# Patient Record
Sex: FEMALE | Race: WHITE
[De-identification: names, ages, dates, MRNs, and addresses within clinical notes are randomized per-mention and may not be internally consistent; named-entity substitution may affect disease eponyms.]

---

## 2017-07-09 ENCOUNTER — HOSPITAL ENCOUNTER (EMERGENCY)
Dept: HOSPITAL 25 - ED | Age: 42
LOS: 1 days | Discharge: HOME | End: 2017-07-10
Payer: COMMERCIAL

## 2017-07-09 DIAGNOSIS — F17.210: ICD-10-CM

## 2017-07-09 DIAGNOSIS — K80.50: Primary | ICD-10-CM

## 2017-07-09 DIAGNOSIS — R10.11: ICD-10-CM

## 2017-07-09 PROCEDURE — 80053 COMPREHEN METABOLIC PANEL: CPT

## 2017-07-09 PROCEDURE — 36415 COLL VENOUS BLD VENIPUNCTURE: CPT

## 2017-07-09 PROCEDURE — 83690 ASSAY OF LIPASE: CPT

## 2017-07-09 PROCEDURE — 96374 THER/PROPH/DIAG INJ IV PUSH: CPT

## 2017-07-09 PROCEDURE — 86140 C-REACTIVE PROTEIN: CPT

## 2017-07-09 PROCEDURE — 96375 TX/PRO/DX INJ NEW DRUG ADDON: CPT

## 2017-07-09 PROCEDURE — 85025 COMPLETE CBC W/AUTO DIFF WBC: CPT

## 2017-07-09 PROCEDURE — 99284 EMERGENCY DEPT VISIT MOD MDM: CPT

## 2017-07-09 PROCEDURE — 83605 ASSAY OF LACTIC ACID: CPT

## 2017-07-09 PROCEDURE — 74177 CT ABD & PELVIS W/CONTRAST: CPT

## 2017-07-09 PROCEDURE — 81003 URINALYSIS AUTO W/O SCOPE: CPT

## 2017-07-10 VITALS — DIASTOLIC BLOOD PRESSURE: 78 MMHG | SYSTOLIC BLOOD PRESSURE: 115 MMHG

## 2017-07-10 LAB
ALBUMIN SERPL BCG-MCNC: 4.2 G/DL (ref 3.2–5.2)
ALP SERPL-CCNC: 57 U/L (ref 34–104)
ALT SERPL W P-5'-P-CCNC: 22 U/L (ref 7–52)
ANION GAP SERPL CALC-SCNC: 6 MMOL/L (ref 2–11)
AST SERPL-CCNC: 14 U/L (ref 13–39)
BUN SERPL-MCNC: 14 MG/DL (ref 6–24)
BUN/CREAT SERPL: 15.4 (ref 8–20)
CALCIUM SERPL-MCNC: 9.2 MG/DL (ref 8.6–10.3)
CHLORIDE SERPL-SCNC: 105 MMOL/L (ref 101–111)
GLOBULIN SER CALC-MCNC: 2.7 G/DL (ref 2–4)
GLUCOSE SERPL-MCNC: 119 MG/DL (ref 70–100)
HCO3 SERPL-SCNC: 24 MMOL/L (ref 22–32)
HCT VFR BLD AUTO: 44 % (ref 35–47)
HGB BLD-MCNC: 15.3 G/DL (ref 12–16)
LIPASE SERPL-CCNC: 35 U/L (ref 11–82)
MCH RBC QN AUTO: 33 PG (ref 27–31)
MCHC RBC AUTO-ENTMCNC: 35 G/DL (ref 31–36)
MCV RBC AUTO: 95 FL (ref 80–97)
POTASSIUM SERPL-SCNC: 3.5 MMOL/L (ref 3.5–5)
PROT SERPL-MCNC: 6.9 G/DL (ref 6.4–8.9)
RBC # BLD AUTO: 4.61 10^6/UL (ref 4–5.4)
SODIUM SERPL-SCNC: 135 MMOL/L (ref 133–145)
WBC # BLD AUTO: 11.2 10^3/UL (ref 3.5–10.8)

## 2017-07-10 NOTE — ED
Nhung DOVER Thomas, scribed for Jerry Tucker MD on 07/09/17 at 2357 .





Abdominal Pain/Female





- HPI Summary


HPI Summary: 





The pt is a 41 y/o F presenting to the ED c/o 8/10 RUQ abd pain that began 

today at 10:00. She was not exerting at onset, and it has worsened since onset. 

She denies nausea at this time. She took ibuprofen PTA for the pain. She claims 

that she had an endoscopy three months ago. NKDA. 








- History of Current Complaint


Chief Complaint: EDFlankPain


Stated Complaint: RIGHT FLANK PAIN


Time Seen by Provider: 07/09/17 23:52


Hx Obtained From: Patient


Hx Last Menstrual Period: 8/4/16


Onset/Duration: Lasting Hours - onset 22:00 today, Still Present, Worse Since - 

onset


Timing: Hours - onset 22:00


Severity Currently: Severe


Pain Intensity: 8


Pain Scale Used: 0-10 Numeric


Location: Discrete At: RUQ


Associated Signs and Symptoms: Negative: Nausea


Allergies/Adverse Reactions: 


 Allergies











Allergy/AdvReac Type Severity Reaction Status Date / Time


 


No Known Allergies Allergy   Verified 07/09/17 23:45














PMH/Surg Hx/FS Hx/Imm Hx


Previously Healthy: No


Endocrine/Hematology History: 


   Denies: Hx Anticoagulant Therapy, Hx Diabetes, Hx Thyroid Disease


Cardiovascular History: 


   Denies: Hx Hypertension, Hx Pacemaker/ICD


Respiratory History: 


   Denies: Hx Asthma, Hx Chronic Obstructive Pulmonary Disease (COPD)


GI History: Reports: Other GI Disorders - "gall bladder problem"


 History: 


   Denies: Hx Renal Disease


Sensory History: 


   Denies: Hx Hearing Aid


Neurological History: 


   Denies: Hx Dementia, Hx Seizures, Other Neuro Impairments/Disorders


Psychiatric History: 


   Denies: Hx Panic Disorder, Hx Substance Abuse





- Surgical History


Surgery Procedure, Year, and Place: TUBAL


Infectious Disease History: No


Infectious Disease History: 


   Denies: Hx Hepatitis, Hx Human Immunodeficiency Virus (HIV), Traveled 

Outside the [place] in Last 30 Days





- Family History


Known Family History: Positive: Hypertension





- Social History


Alcohol Use: None


Substance Use Type: Reports: None


Smoking Status (MU): Heavy Every Day Tobacco Smoker


Type: Cigarettes


Amount Used/How Often: 1/2 - 3/4 ppd





Review of Systems


Constitutional: Negative


Negative: Fever


Eyes: Negative


ENT: Negative


Cardiovascular: Negative


Respiratory: Negative


Positive: Abdominal Pain - RUQ, 8/10, onset today at 22:00.  Negative: Nausea


Genitourinary: Negative


Musculoskeletal: Negative


Skin: Negative


Neurological: Negative


Psychological: Normal


All Other Systems Reviewed And Are Negative: Yes





Physical Exam


Triage Information Reviewed: Yes


Vital Signs On Initial Exam: 


 Initial Vitals











Temp Pulse Resp BP Pulse Ox


 


 98.6 F   85   16   146/101   100 


 


 07/09/17 23:27  07/09/17 23:27  07/09/17 23:27  07/09/17 23:27  07/09/17 23:27











Vital Signs Reviewed: Yes


Appearance: Positive: Well-Appearing, Pain Distress - mild discomfort


Skin: Positive: Warm


Head/Face: Positive: Normal Head/Face Inspection


Eyes: Positive: JANET


ENT: Positive: Hearing grossly normal


Neck: Positive: Supple


Respiratory/Lung Sounds: Positive: Breath Sounds Present


Cardiovascular: Positive: RRR


Abdomen Description: Positive: Soft, Other: - mild diffuse abd tenderness.  

Negative: Distended, Guarding


Bowel Sounds: Positive: Present


Musculoskeletal: Positive: Strength/ROM Intact


Neurological: Positive: Alert, Oriented to Person Place, Time


Psychiatric: Positive: Affect/Mood Appropriate





Diagnostics





- Vital Signs


 Vital Signs











  Temp Pulse Resp BP Pulse Ox


 


 07/09/17 23:43  98.6 F  75  18  147/83  98


 


 07/09/17 23:27  98.6 F  85  16  146/101  100














- Laboratory


Lab Results: 


 Lab Results











  07/10/17 Range/Units





  00:03 


 


WBC  11.2 H  (3.5-10.8)  10^3/ul


 


RBC  4.61  (4.0-5.4)  10^6/ul


 


Hgb  15.3  (12.0-16.0)  g/dl


 


Hct  44  (35-47)  %


 


MCV  95  (80-97)  fL


 


MCH  33 H  (27-31)  pg


 


MCHC  35  (31-36)  g/dl


 


RDW  12  (10.5-15)  %


 


Plt Count  305  (150-450)  10^3/ul


 


MPV  7 L  (7.4-10.4)  um3


 


Neut % (Auto)  55.9  (38-83)  %


 


Lymph % (Auto)  34.7  (25-47)  %


 


Mono % (Auto)  7.1  (1-9)  %


 


Eos % (Auto)  1.7  (0-6)  %


 


Baso % (Auto)  0.6  (0-2)  %


 


Absolute Neuts (auto)  6.2  (1.5-7.7)  10^3/ul


 


Absolute Lymphs (auto)  3.9  (1.0-4.8)  10^3/ul


 


Absolute Monos (auto)  0.8  (0-0.8)  10^3/ul


 


Absolute Eos (auto)  0.2  (0-0.6)  10^3/ul


 


Absolute Basos (auto)  0.1  (0-0.2)  10^3/ul


 


Absolute Nucleated RBC  0.01  10^3/ul


 


Nucleated RBC %  0.1  











Result Diagrams: 


 07/10/17 00:03





 07/10/17 00:03


Lab Statement: Any lab studies that have been ordered have been reviewed, and 

results considered in the medical decision making process.





- CT


  ** CT Abd/Pel


CT Interpretation: Positive (See Comments) - Cholelithiasis. A calculus is 

noted in the gallbladder neck/cystic duct. No mural thickening or 

pericholecystic fluid noted. No inflammatory process of the abdomen or pelvis. 

No abdominal mass, adenopathy, or collection seen.


CT Interpretation Completed By: Radiologist





Re-Evaluation





- Re-Evaluation


  ** First Eval


Re-Evaluation Time: 03:17


Change: Unchanged





  ** Second Eval


Re-Evaluation Time: 04:11


Change: Improved - essentially pain free, tolerating po





Abdominal Pain Fem Course/Dx





- Diagnoses


Provider Diagnoses: 


 Biliary colic








Discharge





- Discharge Plan


Condition: Improved


Disposition: HOME


Patient Education Materials:  Biliary Colic (ED)


Referrals: 


[facility] [place] 3 [place]





The documentation as recorded by the Nhung landers Thomas accurately reflects 

the service I personally performed and the decisions made by me, Jerry Tucker MD.

## 2017-07-10 NOTE — RAD
CLINICAL HISTORY: Abdominal pain



COMPARISON: None



TECHNIQUE: Multiple contiguous axial CT scans were obtained of the abdomen and pelvis

after the administration of intravenous contrast. Coronal and sagittal multiplanar

reformations are submitted for review.  Oral contrast was administered.  Delayed images

were obtained through the abdomen and pelvis.



FINDINGS:    



LUNG BASES: The lung bases are clear.



LIVER: The liver is diffusely low in attenuation compared to the spleen. There are

multiple low-attenuation hepatic parenchymal lesions that are too small to definitively

characterize.

BILE DUCTS: There is no intrahepatic or extrahepatic biliary dilatation.

GALLBLADDER: Multiple gallstones are noted. This includes a calculus at the gallbladder

neck. There is no pericholecystic inflammatory change.



PANCREAS: The pancreas is normal, without mass or ductal dilatation.

SPLEEN: Normal in size and appearance.



UPPER GI TRACT: Evaluation of the gastrointestinal tract is limited by incomplete gastric

distention. The upper GI tract is unremarkable.

SMALL BOWEL AND MESENTERY: The small bowel is normal in contour, course, and caliber.

There is no obstruction or dilatation.

COLON: The colon is normal in contour, course, caliber. There is no pericolonic

inflammatory change. There is a tubular, vermiform, hollow viscus that is blind ending,

and originates from the cecum, consistent with a normal appendix. There is no

periappendiceal inflammatory change. This is best seen on coronal images 36 through 49.



ADRENALS: Normal bilaterally.

KIDNEYS: The kidneys are normal in shape, size, contour, and axis. There is no

hydronephrosis or nephrolithiasis.

BLADDER: The bladder is smooth in contour.



PELVIC ORGANS: The uterus and adnexa are grossly normal for technique.



AORTA: The aorta is normal.

IVC: Unremarkable



LYMPH NODES: There is no lymphadenopathy by size criteria.



ABDOMINAL WALL: There is no evidence for abdominal wall hernia.

BONES AND SOFT TISSUES: There are mild diffuse degenerative changes.

OTHER: None



IMPRESSION:

1.  CHOLELITHIASIS WITHOUT PERICHOLECYSTIC INFLAMMATORY CHANGE TO SUGGEST ACUTE

CHOLECYSTITIS.

2.  FATTY INFILTRATION OF LIVER.

3.  MULTIPLE LOW-ATTENUATION HEPATIC PARENCHYMAL LESIONS. THESE ARE TOO SMALL TO

DEFINITIVELY CHARACTERIZE BUT, IN THE ABSENCE OF A HISTORY OF MALIGNANCY, THESE LIKELY

REPRESENT SMALL CYSTS VERSUS HEMANGIOMAS

## 2017-07-19 ENCOUNTER — HOSPITAL ENCOUNTER (OUTPATIENT)
Dept: HOSPITAL 25 - OR | Age: 42
Discharge: HOME | End: 2017-07-19
Attending: SURGERY
Payer: COMMERCIAL

## 2017-07-19 VITALS — DIASTOLIC BLOOD PRESSURE: 78 MMHG | SYSTOLIC BLOOD PRESSURE: 117 MMHG

## 2017-07-19 DIAGNOSIS — K80.10: Primary | ICD-10-CM

## 2017-07-19 DIAGNOSIS — F17.200: ICD-10-CM

## 2017-07-19 PROCEDURE — 88304 TISSUE EXAM BY PATHOLOGIST: CPT

## 2017-07-19 NOTE — PN
Progress Note





- Progress Note


Date of Service: 07/19/17


Note: 





Brief Operative Note:





Preop and Postop Dx: symptomatic cholelithiasis


Procedure: laparoscopic cholecystectomy


Anesthesia: GET


Surgeon: Ilda


Asst: WANDA Lowery; MARVA Sage


EBL: < 20 ml


Fluids: 1300 ml RL


Drains:none


Specimen: gallbladder


Findings: dictated

## 2017-08-03 NOTE — OP
DATE OF OPERATION:  07/19/17 [facility]

 

DATE OF BIRTH:  [birth year]

 

SURGEON:  Kenji Gonsales MD

 

ASSISTANT:  WANDA Whittington



ANESTHESIOLOGIST:  RENETTA Dockery MD

 

ANESTHESIA:  General anesthesia.

 

PRE-OP DIAGNOSIS:  Symptomatic cholelithiasis.

 

POST-OP DIAGNOSIS:  Symptomatic cholelithiasis.

 

OPERATIVE PROCEDURE:  Laparoscopic cholecystectomy.

 

ESTIMATED BLOOD LOSS:  Less than 20 cc blood loss.

 

FLUIDS:  1300 cc of lactated Ringers.

 

SPECIMEN:  Gallbladder.

 

DRAINS:  None.

 

DESCRIPTION OF PROCEDURE:  The patient was identified in the preoperative area, 
marked, brought to the operating room, placed on the operating table in the 
supine position.  Preoperative antibiotics were given.  Sequential devices were 
placed on bilateral lower extremities.  General anesthesia was induced.  The 
patient's abdomen was prepped and draped in the standard surgical fashion.  A 
time-out was performed.

 

Folds of the umbilicus were elevated anteriorly.  A Veress needle was inserted 
into the abdominal cavity, which was then allowed to insufflate to a pressure 
of 15 mmHg.  The patient tolerated the insufflation well.  Incision was made 
over the Veress needle, which was then removed and then a 5-mm trocar inserted 
into the abdomen.  Laparoscope was inserted through this, and there was no 
evidence of injury from the trocar incision or from the Veress needle.  Review 
of the abdomen showed no other findings.  The additional trocars were placed in 
the following position; a 12 mm in the subxiphoid area and two 5 mm along the 
right costal margin.

 

Tables were repositioned.  The fundus of the gallbladder was grasped and 
elevated above the liver.  Infundibulum was grasped and retracted towards the 
right lower quadrant.  This gave us critical view.  Peritoneum off the lateral 
aspect of the gallbladder was taken with electrocautery.  Medial aspect 
similarly taken.  Cystic duct, cystic artery were isolated in a typical 
fashion.  There were triply clipped and ligated.  The gallbladder was removed 
from the liver bed, placed in an endoscopic retrieval bag and brought out 
through the subxiphoid port site.

 

Review of the cystic duct stump and cystic artery showed no bleeding or bile.  
The abdomen was allowed to collapse.  The patient was positioned back in 
neutral. Trocars were removed under direct vision.  All 4 skin incisions were 
reapproximated with 4-0 Monocryl subcuticular sutures.  Steri-Strips and 
sterile dressing were applied.  The patient tolerated the procedure well and 
and transferred to the PACU in stable condition.

 

 [id number]/CPS #: [id number]

MTDD

## 2019-01-31 ENCOUNTER — HOSPITAL ENCOUNTER (EMERGENCY)
Dept: HOSPITAL 25 - UCEAST | Age: 44
Discharge: HOME | End: 2019-01-31
Payer: COMMERCIAL

## 2019-01-31 VITALS — DIASTOLIC BLOOD PRESSURE: 90 MMHG | SYSTOLIC BLOOD PRESSURE: 148 MMHG

## 2019-01-31 DIAGNOSIS — F17.210: ICD-10-CM

## 2019-01-31 DIAGNOSIS — H10.9: Primary | ICD-10-CM

## 2019-01-31 PROCEDURE — G0463 HOSPITAL OUTPT CLINIC VISIT: HCPCS

## 2019-01-31 PROCEDURE — 99212 OFFICE O/P EST SF 10 MIN: CPT

## 2019-01-31 NOTE — UC
Eye Complaint HPI





- HPI Summary


HPI Summary: 


43-year-old woman comes in with a chief complaint of right eye irritation.  

This started yesterday evening after she got home from work.  She works in a 

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  Not wear 

contacts.  No blurry vision.  The upper eyelid is swollen and she does have 

some crusting on her right eye this morning when she woke up.  Crusting of 

better when she wiped it off.





- History of Current Complaint


Chief Complaint: UCEye


Stated Complaint: EYE IRRITATION


Time Seen by Provider: 01/31/19 15:28


Hx Last Menstrual Period: 1/16/19


Pain Intensity: 1





- Allergies/Home Medications


Allergies/Adverse Reactions: 


 Allergies











Allergy/AdvReac Type Severity Reaction Status Date / Time


 


No Known Allergies Allergy   Verified 01/31/19 15:25














PMH/Surg Hx/FS Hx/Imm Hx


Previously Healthy: Yes


Other History Of: 


   Negative For: Anticoagulant Therapy





- Surgical History


Surgical History: Yes


Surgery Procedure, Year, and Place: TUBAL LIGATION, CHOLECYSTECTOMY





- Family History


Known Family History: Positive: Hypertension





- Social History


Alcohol Use: None


Substance Use Type: None


Smoking Status (MU): Current Every Day Smoker


Type: Cigarettes


Amount Used/How Often: 1 PPD


Household Exposure Type: Cigarettes





Review of Systems


All Other Systems Reviewed And Are Negative: Yes


Constitutional: Positive: Negative


Skin: Positive: Negative


Eyes: Positive: Drainage, Eye Redness, Other - SEE HPI


ENT: Positive: Negative


Respiratory: Positive: Negative


Cardiovascular: Positive: Negative


Gastrointestinal: Positive: Negative


Motor: Positive: Negative


Neurovascular: Positive: Negative


Musculoskeletal: Positive: Negative


Neurological: Positive: Negative


Psychological: Positive: Negative


Is Patient Immunocompromised?: No





Physical Exam


Triage Information Reviewed: Yes


Appearance: Well-Appearing, No Pain Distress, Well-Nourished


Vital Signs: 


 Initial Vital Signs











Temp  97.9 F   01/31/19 15:20


 


Pulse  83   01/31/19 15:20


 


Resp  16   01/31/19 15:20


 


BP  148/90   01/31/19 15:20


 


Pulse Ox  100   01/31/19 15:20











Vital Signs Reviewed: Yes


Eyes: Positive: Conjunctiva Inflamed, Discharge, Other: - The upper eyelid is 

slightly swollen.  No obvious stye at this time.  There is clear drainage.  

Scleral injection.PERRLA/EOMI. used tetracaine to numb the eye and used 

foreseen stain and I did not see any uptake or any foreign body on my 

examination.


ENT: Positive: Normal ENT inspection.  Negative: Nasal congestion, Nasal 

drainage


Neck exam: Normal


Neck: Positive: Supple


Respiratory: Positive: No respiratory distress


Musculoskeletal Exam: Normal


Musculoskeletal: Positive: Strength Intact, ROM Intact


Neurological Exam: Normal


Neurological: Positive: Alert, Muscle Tone Normal


Psychological Exam: Normal


Psychological: Positive: Normal Response To Family, Age Appropriate Behavior


Skin Exam: Normal





Eye Complaint Course/Dx





- Course


Course Of Treatment: I did not see any corneal abrasion however given the 

history is the most likely cause of the patient's eye irritation and discharge.

  Other possibilities include an early stye or a and infectious conjunctivitis.

  The treat with tobramycin eyedrops.  We discussed that if the eyelid swells 

more in appearance stye that she can use warm compresses.  Overall if her eye 

is not getting better she will follow up with ophthalmology.





- Differential Dx/Diagnosis


Provider Diagnosis: 


 Conjunctivitis








Discharge





- Sign-Out/Discharge


Documenting (check all that apply): Patient Departure


All imaging exams completed and their final reports reviewed: No Studies





- Discharge Plan


Condition: Stable


Disposition: HOME


Prescriptions: 


Tobramycin 0.3% OPHTH.SOL* 1 drop RIGHT EYE Q4H #1 btl


Patient Education Materials:  Conjunctivitis (ED)


Referrals: 


[facility] [Provider Group]


Additional Instructions: 


FOLLOW UP WITH OPHTHALMOLOGY IF NOT COMPLETELY IMPROVED.


GET RECHECKED SOONER WITH ANY WORSENING OF YOUR CONDITION OR QUESTIONS OR 

CONCERNS.





- Billing Disposition and Condition


Condition: STABLE


Disposition: Home

## 2019-09-01 ENCOUNTER — HOSPITAL ENCOUNTER (EMERGENCY)
Dept: HOSPITAL 25 - UCEAST | Age: 44
Discharge: HOME | End: 2019-09-01
Payer: COMMERCIAL

## 2019-09-01 VITALS — DIASTOLIC BLOOD PRESSURE: 93 MMHG | SYSTOLIC BLOOD PRESSURE: 152 MMHG

## 2019-09-01 DIAGNOSIS — M72.2: Primary | ICD-10-CM

## 2019-09-01 DIAGNOSIS — F17.210: ICD-10-CM

## 2019-09-01 PROCEDURE — 99212 OFFICE O/P EST SF 10 MIN: CPT

## 2019-09-01 PROCEDURE — G0463 HOSPITAL OUTPT CLINIC VISIT: HCPCS

## 2019-09-01 NOTE — UC
Lower Extremity/Ankle HPI





- HPI Summary


HPI Summary: 





43 yo female presents with LEFT heel and foot pain. She tells me that over the 

last 1-2 weeks she has had worsening left heel pain that is worse with 

prolonged standing or walking. Pain with dorsiflexion. She works in the kitchen 

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 Has not taken anything OTC for her 

symptoms. Pain does improve with rest. Denies numbness, tingling, or specific 

injury. 





- History of Current Complaint


Stated Complaint: FOOT COMPLAINT


Time Seen by Provider: 09/01/19 11:08


Hx Obtained From: Patient


Hx Last Menstrual Period: 1/16/19


Onset/Duration: Gradual Onset


Severity Initially: Mild


Severity Currently: Moderate


Pain Intensity: 7


Pain Scale Used: 0-10 Numeric


Aggravating Factor(s): Standing, Ambulation


Alleviating Factor(s): Rest, Elevation


Able to Bear Weight: Yes





- Allergies/Home Medications


Allergies/Adverse Reactions: 


 Allergies











Allergy/AdvReac Type Severity Reaction Status Date / Time


 


No Known Allergies Allergy   Verified 09/01/19 11:11











Home Medications: 


 Home Medications





NK [No Home Medications Reported]  09/01/19 [History Confirmed 09/01/19]











PMH/Surg Hx/FS Hx/Imm Hx





- Additional Past Medical History


Additional PMH: 





None


Other History Of: 


   Negative For: Anticoagulant Therapy





- Surgical History


Surgical History: Yes


Surgery Procedure, Year, and Place: TUBAL LIGATION, CHOLECYSTECTOMY





- Family History


Known Family History: Positive: Hypertension





- Social History


Lives: With Family


Alcohol Use: None


Substance Use Type: None


Smoking Status (MU): Current Every Day Smoker


Type: Cigarettes


Amount Used/How Often: 1 PPD


Household Exposure Type: Cigarettes





Review of Systems


All Other Systems Reviewed And Are Negative: No


Constitutional: Positive: Negative


Skin: Positive: Negative


Respiratory: Positive: Negative


Cardiovascular: Positive: Negative


Neurovascular: Positive: Negative


Musculoskeletal: Positive: Other: - Left foot pain


Neurological: Positive: Negative


Psychological: Positive: Negative





Physical Exam





- Summary


Physical Exam Summary: 





GENERAL: NAD. WDWN. No pain distress.


SKIN: No rashes, sores, lesions, or open wounds.


CHEST:  No accessory muscle use. Breathing comfortably and in no distress.


CV:  Pulses intact PT and DP. Cap refill <2seconds


MSK: LEFT FOOT: Moderate TTP about plantar heel and plantar fascia. Pain with 

extension of toes. Pain with dorsiflexion at ankle. No edema or obvious bony 

deformities. 


NEURO: Alert. Sensations intact and symmetric B/L LEs


PSYCH: Age appropriate behavior.


Triage Information Reviewed: Yes


Vital Signs: 





Vital Signs:











Temp Pulse Resp BP Pulse Ox


 


 98 F   85   18   152/93   100 


 


 09/01/19 11:08  09/01/19 11:08  09/01/19 11:08  09/01/19 11:08  09/01/19 11:08











Vital Signs Reviewed: Yes





Lower Extremity Course/Dx





- Course


Course Of Treatment: 





Suspect plantar fasciitis.


Pt was placed in a CAM boot. Advised to RICE and take ibuprofen for discomfort.


F/u with Ortho if symptoms do not improve





- Differential Dx/Diagnosis


Provider Diagnosis: 


 Plantar fasciitis








Discharge ED





- Sign-Out/Discharge


Documenting (check all that apply): Patient Departure


All imaging exams completed and their final reports reviewed: No Studies





- Discharge Plan


Condition: Stable


Disposition: HOME


Patient Education Materials:  Plantar Fasciitis Exercises (GEN), Plantar 

Fasciitis (ED)


Referrals: 


No Primary Care Phys,NOPCP [Primary Care Provider] - 


Tani Natarajan MD [Medical Doctor] - If Needed


Additional Instructions: 


If you develop a fever, shortness of breath, chest pain, new or worsening 

symptoms - please call your PCP or go to the ED immediately.


 





Your blood pressure was high at todays visit. Please see your primary provider 

within 4 weeks for recheck and re-evaluation.








Rest, Ice, and elevate your foot as much as possible





Use the walking boot when on your feet for long periods of time for at least 2 

weeks





If your symptoms do not improve after 2 weeks - please call Orthopedics at the 

number below to schedule an appointment for further evaluation





- Billing Disposition and Condition


Condition: STABLE


Disposition: Home





- Attestation Statements


Provider Attestation: 





I was available for consult. This patient was seen by the TITO. The patient was 

not presented to, seen by, or examined by me. -Chang